# Patient Record
Sex: FEMALE | Employment: OTHER | URBAN - METROPOLITAN AREA
[De-identification: names, ages, dates, MRNs, and addresses within clinical notes are randomized per-mention and may not be internally consistent; named-entity substitution may affect disease eponyms.]

---

## 2023-10-04 RX ORDER — OMEPRAZOLE 40 MG/1
40 CAPSULE, DELAYED RELEASE ORAL
COMMUNITY

## 2023-10-04 RX ORDER — LORATADINE 10 MG/1
10 TABLET ORAL
COMMUNITY

## 2023-10-04 RX ORDER — ATORVASTATIN CALCIUM 10 MG/1
10 TABLET, FILM COATED ORAL
COMMUNITY

## 2023-10-04 RX ORDER — TIZANIDINE 4 MG/1
4 TABLET ORAL EVERY EVENING
COMMUNITY

## 2023-10-04 RX ORDER — MULTIVIT-MIN/IRON/FOLIC ACID/K 18-600-40
CAPSULE ORAL DAILY
COMMUNITY

## 2023-10-04 RX ORDER — TRAZODONE HYDROCHLORIDE 50 MG/1
50 TABLET ORAL
COMMUNITY
End: 2023-10-05

## 2023-10-04 RX ORDER — PRAZOSIN HYDROCHLORIDE 1 MG/1
1 CAPSULE ORAL
COMMUNITY
End: 2023-10-05

## 2023-10-04 RX ORDER — DIVALPROEX SODIUM 500 MG/1
500 TABLET, DELAYED RELEASE ORAL
COMMUNITY
End: 2023-10-05

## 2023-10-04 RX ORDER — FOLIC ACID 1 MG/1
1 TABLET ORAL EVERY MORNING
COMMUNITY

## 2023-10-04 RX ORDER — LEVOTHYROXINE SODIUM 0.05 MG/1
50 TABLET ORAL EVERY MORNING
COMMUNITY

## 2023-10-04 RX ORDER — SENNOSIDES 8.6 MG
1300 CAPSULE ORAL
COMMUNITY

## 2023-10-04 RX ORDER — BUPROPION HYDROCHLORIDE 150 MG/1
150 TABLET ORAL DAILY
COMMUNITY

## 2023-10-04 RX ORDER — TRIAMTERENE AND HYDROCHLOROTHIAZIDE 37.5; 25 MG/1; MG/1
1 CAPSULE ORAL EVERY MORNING
COMMUNITY

## 2023-10-04 RX ORDER — DIVALPROEX SODIUM 500 MG/1
1500 TABLET, DELAYED RELEASE ORAL
COMMUNITY

## 2023-10-04 RX ORDER — FLUOXETINE HYDROCHLORIDE 40 MG/1
40 CAPSULE ORAL EVERY MORNING
COMMUNITY

## 2023-10-04 RX ORDER — FLUTICASONE PROPIONATE 50 MCG
1 SPRAY, SUSPENSION (ML) NASAL 2 TIMES DAILY
COMMUNITY

## 2023-10-04 RX ORDER — METOPROLOL SUCCINATE 25 MG/1
25 TABLET, EXTENDED RELEASE ORAL EVERY MORNING
COMMUNITY

## 2023-10-05 RX ORDER — MIRTAZAPINE 45 MG/1
45 TABLET, FILM COATED ORAL
COMMUNITY

## 2023-10-05 RX ORDER — TRAZODONE HYDROCHLORIDE 100 MG/1
100 TABLET ORAL
COMMUNITY

## 2023-10-05 RX ORDER — PRAZOSIN HYDROCHLORIDE 2 MG/1
2 CAPSULE ORAL
COMMUNITY

## 2023-10-05 NOTE — PRE-PROCEDURE INSTRUCTIONS
Pre-Surgery Instructions:   Medication Instructions   • acetaminophen (TYLENOL) 650 mg CR tablet Take night before surgery   • Ammonium Lactate (LACTIC ACID EX) Hold day of surgery. • atorvastatin (LIPITOR) 10 mg tablet Take night before surgery   • buPROPion (WELLBUTRIN XL) 150 mg 24 hr tablet Not taking   • Cholecalciferol (Vitamin D) 50 MCG (2000 UT) CAPS Hold day of surgery. • divalproex sodium (DEPAKOTE) 500 mg DR tablet Take night before surgery   • FLUoxetine (PROzac) 40 MG capsule Take day of surgery. • fluticasone (FLONASE) 50 mcg/act nasal spray Take day of surgery. • folic acid (FOLVITE) 1 mg tablet Hold day of surgery. • levothyroxine 50 mcg tablet Take day of surgery. • loratadine (CLARITIN) 10 mg tablet Hold day of surgery. • Magnesium Citrate 200 MG TABS Hold day of surgery. • metoprolol succinate (TOPROL-XL) 25 mg 24 hr tablet Take day of surgery. • mirtazapine (REMERON) 45 MG tablet Take night before surgery   • omeprazole (PriLOSEC) 40 MG capsule Take night before surgery   • prazosin (MINIPRESS) 2 mg capsule Take night before surgery   • tiZANidine (ZANAFLEX) 4 mg tablet Take night before surgery   • traZODone (DESYREL) 100 mg tablet Take night before surgery   • triamterene-hydrochlorothiazide (DYAZIDE) 37.5-25 mg per capsule Hold day of surgery. • [DISCONTINUED] divalproex sodium (DEPAKOTE) 500 mg DR tablet duplicate entry   • [DISCONTINUED] Magnesium 100 MG CAPS dosage change   • [DISCONTINUED] prazosin (MINIPRESS) 1 mg capsule dosage change   • [DISCONTINUED] traZODone (DESYREL) 50 mg tablet dosage change    Medication instructions for day surgery reviewed. Please use only a sip of water to take your instructed medications. Avoid all over the counter vitamins, supplements and NSAIDS for one week prior to surgery per anesthesia guidelines. Tylenol is ok to take as needed. Facility to birdieow Dr. Koenig's instructions.  Pt to arrive to the [facility] at the given time and proceed to the [facility] unit 2nd floor. You will receive a call one business day prior to surgery with an arrival time and hospital directions. If your surgery is scheduled on a Monday, the hospital will be calling you on the Friday prior to your surgery. If you have not heard from anyone by 8pm, please call the hospital supervisor through the hospital  at [phone]. Jeimy Escalera [phone]). Do not eat or drink anything after midnight the night before your surgery, including candy, mints, lifesavers, or chewing gum. Do not drink alcohol 24hrs before your surgery. Try not to smoke at least 24hrs before your surgery. Follow the pre surgery showering instructions as listed in the [facility] Surgical Experience Booklet” or otherwise provided by your surgeon's office. Do not shave the surgical area 24 hours before surgery. Do not apply any lotions, creams, including makeup, cologne, deodorant, or perfumes after showering on the day of your surgery. No contact lenses, eye make-up, or artificial eyelashes. Remove nail polish, including gel polish, and any artificial, gel, or acrylic nails if possible. Remove all jewelry including rings and body piercing jewelry. Wear causal clothing that is easy to take on and off. Consider your type of surgery. Keep any valuables, jewelry, piercings at home. Please bring any specially ordered equipment (sling, braces) if indicated. Arrange for a responsible person to drive you to and from the hospital on the day of your surgery. Visitor Guidelines discussed. Call the surgeon's office with any new illnesses, exposures, or additional questions prior to surgery. Please reference your [facility] Experience Booklet” for additional information to prepare for your upcoming surgery.

## 2023-10-06 ENCOUNTER — ANESTHESIA EVENT (OUTPATIENT)
Dept: PERIOP | Facility: HOSPITAL | Age: 63
End: 2023-10-06
Payer: MEDICAID

## 2023-10-06 ENCOUNTER — HOSPITAL ENCOUNTER (OUTPATIENT)
Facility: HOSPITAL | Age: 63
Setting detail: OUTPATIENT SURGERY
Discharge: ANOTHER HOSPITAL | End: 2023-10-06
Attending: DENTIST | Admitting: DENTIST
Payer: MEDICAID

## 2023-10-06 ENCOUNTER — APPOINTMENT (OUTPATIENT)
Dept: RADIOLOGY | Facility: HOSPITAL | Age: 63
End: 2023-10-06
Payer: MEDICAID

## 2023-10-06 ENCOUNTER — ANESTHESIA (OUTPATIENT)
Dept: PERIOP | Facility: HOSPITAL | Age: 63
End: 2023-10-06
Payer: MEDICAID

## 2023-10-06 VITALS
HEART RATE: 76 BPM | SYSTOLIC BLOOD PRESSURE: 122 MMHG | RESPIRATION RATE: 18 BRPM | DIASTOLIC BLOOD PRESSURE: 89 MMHG | OXYGEN SATURATION: 95 % | TEMPERATURE: 97.6 F | HEIGHT: 63 IN

## 2023-10-06 PROCEDURE — C1765 ADHESION BARRIER: HCPCS | Performed by: DENTIST

## 2023-10-06 RX ORDER — PROPOFOL 10 MG/ML
INJECTION, EMULSION INTRAVENOUS AS NEEDED
Status: DISCONTINUED | OUTPATIENT
Start: 2023-10-06 | End: 2023-10-06

## 2023-10-06 RX ORDER — LIDOCAINE HYDROCHLORIDE 10 MG/ML
INJECTION, SOLUTION EPIDURAL; INFILTRATION; INTRACAUDAL; PERINEURAL AS NEEDED
Status: DISCONTINUED | OUTPATIENT
Start: 2023-10-06 | End: 2023-10-06

## 2023-10-06 RX ORDER — CHLORHEXIDINE GLUCONATE ORAL RINSE 1.2 MG/ML
SOLUTION DENTAL AS NEEDED
Status: DISCONTINUED | OUTPATIENT
Start: 2023-10-06 | End: 2023-10-06 | Stop reason: HOSPADM

## 2023-10-06 RX ORDER — MAGNESIUM HYDROXIDE 1200 MG/15ML
LIQUID ORAL AS NEEDED
Status: DISCONTINUED | OUTPATIENT
Start: 2023-10-06 | End: 2023-10-06 | Stop reason: HOSPADM

## 2023-10-06 RX ORDER — DEXAMETHASONE SODIUM PHOSPHATE 10 MG/ML
INJECTION, SOLUTION INTRAMUSCULAR; INTRAVENOUS AS NEEDED
Status: DISCONTINUED | OUTPATIENT
Start: 2023-10-06 | End: 2023-10-06

## 2023-10-06 RX ORDER — BUPIVACAINE HYDROCHLORIDE AND EPINEPHRINE 5; 5 MG/ML; UG/ML
INJECTION, SOLUTION PERINEURAL AS NEEDED
Status: DISCONTINUED | OUTPATIENT
Start: 2023-10-06 | End: 2023-10-06 | Stop reason: HOSPADM

## 2023-10-06 RX ORDER — ONDANSETRON 2 MG/ML
INJECTION INTRAMUSCULAR; INTRAVENOUS AS NEEDED
Status: DISCONTINUED | OUTPATIENT
Start: 2023-10-06 | End: 2023-10-06

## 2023-10-06 RX ORDER — ROCURONIUM BROMIDE 10 MG/ML
INJECTION, SOLUTION INTRAVENOUS AS NEEDED
Status: DISCONTINUED | OUTPATIENT
Start: 2023-10-06 | End: 2023-10-06

## 2023-10-06 RX ORDER — SODIUM CHLORIDE, SODIUM LACTATE, POTASSIUM CHLORIDE, CALCIUM CHLORIDE 600; 310; 30; 20 MG/100ML; MG/100ML; MG/100ML; MG/100ML
125 INJECTION, SOLUTION INTRAVENOUS CONTINUOUS
Status: DISCONTINUED | OUTPATIENT
Start: 2023-10-06 | End: 2023-10-06 | Stop reason: HOSPADM

## 2023-10-06 RX ADMIN — SODIUM CHLORIDE, SODIUM LACTATE, POTASSIUM CHLORIDE, AND CALCIUM CHLORIDE: .6; .31; .03; .02 INJECTION, SOLUTION INTRAVENOUS at 10:47

## 2023-10-06 RX ADMIN — ROCURONIUM BROMIDE 50 MG: 10 INJECTION, SOLUTION INTRAVENOUS at 10:57

## 2023-10-06 RX ADMIN — PROPOFOL 100 MG: 10 INJECTION, EMULSION INTRAVENOUS at 11:05

## 2023-10-06 RX ADMIN — ONDANSETRON 4 MG: 2 INJECTION INTRAMUSCULAR; INTRAVENOUS at 11:57

## 2023-10-06 RX ADMIN — PROPOFOL 100 MG: 10 INJECTION, EMULSION INTRAVENOUS at 10:57

## 2023-10-06 RX ADMIN — DEXAMETHASONE SODIUM PHOSPHATE 10 MG: 10 INJECTION, SOLUTION INTRAMUSCULAR; INTRAVENOUS at 11:27

## 2023-10-06 RX ADMIN — SUGAMMADEX 150 MG: 100 INJECTION, SOLUTION INTRAVENOUS at 11:54

## 2023-10-06 RX ADMIN — LIDOCAINE HYDROCHLORIDE 50 MG: 10 INJECTION, SOLUTION EPIDURAL; INFILTRATION; INTRACAUDAL at 10:57

## 2023-10-06 NOTE — DISCHARGE INSTRUCTIONS
DISCHARGE INSTRUCTIONS  DENTAL PROCEDURE      1. Saline rinses 4x per day for 10 days    2. Do not use drinking straws if you have had dental extractions    3. Soft diet for 24 hours    4. May return to previous work and activity in  24-48 hours    5.   Return to office for follow-up on 7-10 days

## 2023-10-06 NOTE — ANESTHESIA POSTPROCEDURE EVALUATION
Post-Op Assessment Note    CV Status:  Stable    Pain management: adequate     Mental Status:  Awake and confused   Hydration Status:  Euvolemic   PONV Controlled:  Controlled   Airway Patency:  Patent      Post Op Vitals Reviewed: Yes            No notable events documented.     BP   122/69   Temp  98   Pulse  77   Resp   18   SpO2   99

## 2023-10-06 NOTE — ANESTHESIA PREPROCEDURE EVALUATION
Procedure:  COMPLETE ORAL REHABILITATION (Mouth)    Relevant Problems   CARDIO   (+) Hyperlipidemia   (+) Hypertension      NEURO/PSYCH   (+) Anxiety      Digestive   (+) Periodontal disease      Endocrine   (+) Disease of thyroid gland      Other   (+) Bipolar disorder (HCC)   (+) Intellectual disability   (+) Prediabetes             Anesthesia Plan  ASA Score- 3     Anesthesia Type- general with ASA Monitors. Additional Monitors:   Airway Plan: NTT. Plan Factors-    Chart reviewed. Induction- intravenous. Postoperative Plan-     Informed Consent- Anesthetic plan and risks discussed with patient. I personally reviewed this patient with the CRNA. Discussed and agreed on the Anesthesia Plan with the CRNA. Ad Oakes

## 2023-10-06 NOTE — OP NOTE
COMPLETE ORAL REHABILITATION. DENTAL EXAM. FULL-MOUTH DENTAL XRAYS. PLANING & SCALING. PERIO-CHARTING. EXTRACTIONS: #15. D/SENSE. FLOURIDE. Postoperative Note  PATIENT NAME: Melissa PRICE: 1960  MRN: [id number]  [facility] OR ROOM 02    Surgery Date: 10/6/2023    Unspecified intellectual disabilities [F79]  Chronic periodontitis, unspecified [K05.30]    Post-Op Diagnosis Codes:     * Unspecified intellectual disabilities [F79]     * Chronic periodontitis, unspecified [Q34.99]    Procedure(s):  COMPLETE ORAL REHABILITATION. DENTAL EXAM. FULL-MOUTH DENTAL XRAYS. PLANING & SCALING. PERIO-CHARTING. EXTRACTIONS: #15. D/SENSE. FLOURIDE. Surgeon(s) and Role:     * Miroslava Roman DMD - Primary    Specimens:  none    Estimated Blood Loss:   Minimal    Anesthesia Type:   General     Operative report:            Complications:   None    SIGNATURE: Miroslava Roman DMD   DATE: 2023   TIME: 11:54 AMOPERATIVE REPORT  PATIENT NAME: Melissa PRICE:  1960  MRN: [id number]  Pt Location: [facility] OR ROOM 02    SURGERY DATE: 10/6/2023    Surgeon(s) and Role:     * Miroslava Roman DMD - Primary    Preop Diagnosis:  Unspecified intellectual disabilities [F79]  Chronic periodontitis, unspecified [K05.30]    Post-Op Diagnosis Codes:     * Unspecified intellectual disabilities [F79]     * Chronic periodontitis, unspecified [N35.68]    Procedure(s):  COMPLETE ORAL REHABILITATION. DENTAL EXAM. FULL-MOUTH DENTAL XRAYS. PLANING & SCALING. PERIO-CHARTING. EXTRACTIONS: #15. D/SENSE. FLOURIDE. Specimen(s):  * No specimens in log *    Estimated Blood Loss:   Minimal    Drains:  * No LDAs found *    Anesthesia Type:   General    Operative Indications:  Unspecified intellectual disabilities [F79]  Chronic periodontitis, unspecified [K05.30]  Chronic adult periodontitis, caries, severe malocclusion    Operative Findings:      Complications:   None    Procedure and Technique:   Following the induction of IV inhalation anesthesia with nasotracheal intubation, this patient was prepped and draped for an intraoral dental surgical procedure. 14 periapical dental radiographs were exposed and stored. Preliminary treatment plan formulated at that time. Following insertion of a posterior pharyngeal pack with Ray-Bob gauze single tail moistened debridement of the dentition was completed utilizing ultrasonic scaling with the [id number] [facility] Z Cavitron unit. Both large and slim tip inserts were utilized. Hand-held curettes and 's use were appropriate. Comprehensive evaluation the patient's oral cavity and teeth was then completed. This information was related to the aforementioned radiographic survey whereby definitive treatment plan was then set forth. Following administration of 7.2 mL of Marcaine 0.5% epi concentration 1-200,000 patient had gingivectomy surgery in all 4 quadrants. Upper right, upper left, lower left, lower right. The Bovie electrocautery unit was utilized the setting was #25 for both cutting and coagulation. Hemostasis achieved in a collaborative modality. Attention was then directed to nonrestorable tooth #15. This tooth was extracted using standard elevators and forceps Gelfoam and two 3-0 Vicryl sutures used for closure. Patient also had adult prophylaxis, desensitizing medicaments fluoride treatment performed for all remaining dentition. The posterior pharyngeal pack was removed and the oropharyngeal area suction irrigated with sterile saline in usual manner. The patient left the operating room in satisfactory condition and was transported to the PACU without incident. I was present for the entire procedure.     Patient Disposition:  PACU         SIGNATURE: Loi Kwan DMD  DATE: October 6, 2023  TIME: 11:54 AM

## 2023-10-06 NOTE — QUICK NOTE
Patient seen and examined in same-day surgery center. Patient was clinically evaluated and all of paper chart was reviewed. Patient was not found to have any sort of changes in the last 30 days since they were seen and evaluated by their primary care provider. Patient is still consistent with findings of being medically cleared for procedure.     Ht 5' 3" (1.6 m)

## (undated) DEVICE — Device

## (undated) DEVICE — 3/8 INCH SMOKE TUBING

## (undated) DEVICE — SYRINGE BRIXTON AIRWATER SLEEVE CLEAR

## (undated) DEVICE — MONOJECT NEEDLES 27 GA SHORT METAL HUB

## (undated) DEVICE — SPECIMEN SOCK - SHORT: Brand: MEDI-VAC

## (undated) DEVICE — ACCLEAN PROPHY PASTE COARSE: Brand: HENRY SCHEIN

## (undated) DEVICE — DISPOS-A BRUSH STANDARD

## (undated) DEVICE — GLOVE INDICATOR PI UNDERGLOVE SZ 8.5 BLUE

## (undated) DEVICE — PENCIL ELECTROSURG E-Z CLEAN -0035H

## (undated) DEVICE — ASTOUND STANDARD SURGICAL GOWN, XL: Brand: CONVERTORS

## (undated) DEVICE — GELFOAM 100

## (undated) DEVICE — DISPOS-A-BRUSH FINE BRISTLE

## (undated) DEVICE — PROPHY ANGLE FIRM WHITE DISP LF

## (undated) DEVICE — GLOVE SRG BIOGEL 8

## (undated) DEVICE — ELECTRODE NEEDLE MEGAFINE E-Z CLEAN 2IN 45DEG -0119

## (undated) DEVICE — DENTAL PACK: Brand: CARDINAL HEALTH